# Patient Record
Sex: FEMALE | Race: WHITE | Employment: FULL TIME | URBAN - METROPOLITAN AREA
[De-identification: names, ages, dates, MRNs, and addresses within clinical notes are randomized per-mention and may not be internally consistent; named-entity substitution may affect disease eponyms.]

---

## 2017-11-06 ENCOUNTER — HOSPITAL ENCOUNTER (OUTPATIENT)
Dept: PHYSICAL THERAPY | Age: 32
Discharge: HOME | End: 2017-11-06
Payer: OTHER GOVERNMENT

## 2017-11-06 PROCEDURE — 97162 PT EVAL MOD COMPLEX 30 MIN: CPT

## 2017-11-06 PROCEDURE — 97110 THERAPEUTIC EXERCISES: CPT

## 2017-11-06 PROCEDURE — 97140 MANUAL THERAPY 1/> REGIONS: CPT

## 2017-11-06 NOTE — PROGRESS NOTES
PHYSICAL THERAPY - DAILY TREATMENT NOTE    Patient Name: Lizandro Martell        Date: 2017  : 1985   YES Patient  Verified  Visit #:     Insurance: Payor:  / Plan: Kerwin Meek DEPENDENTS / Product Type:  /      In time: 1100 Out time: 1200   Total Treatment Time: 60     Medicare Time Tracking (below)   Total Timed Codes (min):   1:1 Treatment Time:       TREATMENT AREA =  Low back pain [M54.5]  SUBJECTIVE  Pain Level (on 0 to 10 scale):  3  / 10   Medication Changes/New allergies or changes in medical history, any new surgeries or procedures?     NO    If yes, update Summary List   Subjective Functional Status/Changes:  []  No changes reported     See POC          OBJECTIVE  Modalities Rationale:     decrease pain and increase tissue extensibility to improve patient's ability to tolerate ADL's   min [] Estim, type/location:                                      []  att     []  unatt     []  w/US     []  w/ice    []  w/heat    min []  Mechanical Traction: type/lbs                   []  pro   []  sup   []  int   []  cont    []  before manual    []  after manual    min []  Ultrasound, settings/location:      min []  Iontophoresis w/ dexamethasone, location:                                               []  take home patch       []  in clinic   10 min [x]  Ice     [x]  Heat    location/position: Semireclined; CP to L/S MH to C/S/UT    min []  Vasopneumatic Device, press/temp:     min []  Other:    [] Skin assessment post-treatment (if applicable):    []  intact    []  redness- no adverse reaction     []redness  adverse reaction:        10 min Therapeutic Exercise:  [x]  See flow sheet   Rationale:       increase ROM and increase strength to improve the patients ability to perform ADL's     10 min Manual Therapy: MET for posterior rotation   Rationale:       decrease pain to improve patient's ability to walk without pain     min Therapeutic Activity: Rationale:        min Neuromuscular Re-ed:    Rationale:       min Gait Training:    Rationale:       min Patient Education:  YES  Reviewed HEP   [x]  Progressed/Changed HEP based on:   Issued written HEP and reviewed     Other Objective/Functional Measures:    See POC  TE per FS     Post Treatment Pain Level (on 0 to 10) scale:   3  / 10     ASSESSMENT  Assessment/Changes in Function:     See POC     []  See Progress Note/Recertification   Patient will continue to benefit from skilled PT services to modify and progress therapeutic interventions, address functional mobility deficits, address ROM deficits, address strength deficits, analyze and address soft tissue restrictions, analyze and cue movement patterns, analyze and modify body mechanics/ergonomics and assess and modify postural abnormalities to attain remaining goals. Progress toward goals / Updated goals:    See POC     PLAN  [x]  Upgrade activities as tolerated YES Continue plan of care   []  Discharge due to :    []  Other:      Therapist: Bridgette Foster PT    Date: 11/6/2017 Time: 11:09 AM     No future appointments.

## 2017-11-06 NOTE — PROGRESS NOTES
[facility] PHYSICAL THERAPY AT [facility] [address] [place], [address]  Phone: [phone]  Fax: [phone] / [address] [facility]  Patient Name: Alfie Avitia : 1985   Medical   Diagnosis: Low back pain [M54.5] Treatment Diagnosis: Back and hip pain   Onset Date:      Referral Source: Donavan Zapata NP Start of Care [facility]): 2017   Prior Hospitalization: See medical history Provider #: [id number]   Prior Level of Function: Independent all activities/no restrictions   Comorbidities: Pregnant   Medications: Verified on Patient Summary List   The Plan of Care and following information is based on the information from the initial evaluation.   ==================================================================================  Assessment / key information:  Patient is a 33 y.o. female who presents to [facility] at [address] [facility] with Dx of back and hip pain. Patient is 33 weeks pregnant and reports worsening left hip and back pain. She states she has chronic upper back pain that has worsened with pregnancy and the hip pain is recent since completing the wicked 10K. Patient reports sx are intermittent  in nature with worsening of sx with running 30-45 minutes, yoga. Sx improve with rest.  Average reported pain level at 3/10, 5/10 at worst & 2-3/10 at best. 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 Upon objective evaluation patient demonstrates L posterior rotation, resolved 50% with MET, decreased scapular strength, 4/5 bilaterally, significant tightness in bilateral UT and levator with decreased c/s rotation by 25% L>R, pain in R UT. Decreased mobility throughout T/S.   Patient can benefit from PT interventions to improve ROM, strength, posture, body mechanics, decrease pain, to facilitate ADLs & overall functional status.   ==================================================================================  Eval Complexity: History MEDIUM  Complexity : 1-2 comorbidities / personal factors will impact the outcome/ POC ;  Examination  MEDIUM Complexity : 3 Standardized tests and measures addressing body structure, function, activity limitation and / or participation in recreation ; Presentation MEDIUM Complexity : Evolving with changing characteristics ; Decision Making MEDIUM Complexity : FOTO score of 26-74; Overall Complexity MEDIUM  Problem List: pain affecting function, decrease ROM, decrease strength, decrease ADL/ functional abilitiies, decrease activity tolerance, decrease flexibility/ joint mobility and other FOTO 56   Treatment Plan may include any combination of the following: Therapeutic exercise, Therapeutic activities, Neuromuscular re-education, Physical agent/modality, Manual therapy, Patient education and Other: Dry Needling  Patient / Family readiness to learn indicated by: asking questions, trying to perform skills and interest  Persons(s) to be included in education: patient (P)  Barriers to Learning/Limitations: None  Measures taken:    Patient Goal (s): \"Get my hip back in line, not in constant pain\"   Patient self reported health status: excellent  Rehabilitation Potential: excellent   Short Term Goals: To be accomplished in  2-3  weeks:  1) Patient to be independent and compliant with initial HEP to prevent further disability. 2) Patient will report decreased c/o pain to < or = 3/10 at worst to facilitate increased tolerance to sitting/work related tasks with manageable sx in the back. 3) Improve FOTO score from 55 points to > or = 60 points indicating improved tolerance with ADLs in regards to lower back.  Long Term Goals:  To be accomplished in  4-6  weeks:  1)Patient to be independent & compliant with progressive HEP in preparation for D/C.  2)  Patient will be independent with SI alignment/correction to prevent reoccurrence of injury throughout prgnancy  3) Improve FOTO score from 60 points to > or = 66 points indicating improved tolerance with ADLs in regards to lower back. 4) Patient to report 50% improvement in back pain throughout the day allowing for improved tolerance to ADL's and work related tasks. Frequency / Duration:   Patient to be seen  2-3  times per week for 4-6  weeks:  Patient / Caregiver education and instruction: self care, activity modification and exercises  G-Codes (GP): n/a  Therapist Signature: Anni Stroud PT Date: 97/0/3369   Certification Period: n/a Time: 11:09 AM   ==================================================================================  I certify that the above Physical Therapy Services are being furnished while the patient is under my care. I agree with the treatment plan and certify that this therapy is necessary. Physician Signature:        Date:       Time:     Please sign and return to [facility] or you may fax the signed copy to [phone]. Thank you.

## 2017-11-10 ENCOUNTER — HOSPITAL ENCOUNTER (OUTPATIENT)
Dept: PHYSICAL THERAPY | Age: 32
Discharge: HOME | End: 2017-11-10
Payer: OTHER GOVERNMENT

## 2017-11-10 PROCEDURE — 97140 MANUAL THERAPY 1/> REGIONS: CPT

## 2017-11-10 PROCEDURE — 97110 THERAPEUTIC EXERCISES: CPT

## 2017-11-10 NOTE — PROGRESS NOTES
PHYSICAL THERAPY - DAILY TREATMENT NOTE    Patient Name: Andrew Irene        Date: 11/10/2017  : 1985    Patient  Verified: YES  Visit #:   2   of   12  Insurance: Payor:  / Plan: [employer] AND DEPENDENTS / Product Type:  /      In time: 8:30 Out time: 9:25   Total Treatment Time: 55     Medicare Time Tracking (below)   Total Timed Codes (min):  - 1:1 Treatment Time:  -     TREATMENT AREA/ DIAGNOSIS = Low back pain [M54.5]    SUBJECTIVE  Pain Level (on 0 to 10 scale):  0-5  / 10   Medication Changes/New allergies or changes in medical history, any new surgeries or procedures?     NO    If yes, update Summary List   Subjective Functional Status/Changes:  []  No changes reported     Functional improvement no pain at rest and my upper back feels better   Functional limitation my low back hurts with movement      OBJECTIVE  Modalities Rationale:     decrease edema, decrease inflammation and decrease pain to improve patient's ability to perform ADLs without pain   min [] Estim, type/location:                                      []  att     []  unatt     []  w/US     []  w/ice    []  w/heat    min []  Mechanical Traction: type/lbs                   []  pro   []  sup   []  int   []  cont    []  before manual    []  after manual    min []  Ultrasound, settings/location:      min []  Iontophoresis w/ dexamethasone, location:                                               []  take home patch       []  in clinic   10 min [x]  Ice     [x]  Heat    location/position: Ice to L/S, SI and MH to upper back    min []  Vasopneumatic Device, press/temp:     min []  Other:    [x] Skin assessment post-treatment (if applicable):    [x]  intact    [x]  redness- no adverse reaction     []redness  adverse reaction:        10 min Manual Therapy: SI assessment MET to correct L post innominate   Rationale:      decrease pain, increase ROM and increase tissue extensibility to improve patient's ability to perform ADLs without pain    40 min Therapeutic Exercise:  [x]  See flow sheet   Rationale:      increase ROM and increase strength to improve the patients ability to perform ADLs without pain          min Patient Education:  Yes    [x] Reviewed HEP   []  Progressed/Changed HEP based on: Other Objective/Functional Measures:    Pt with improved upper T/s pain with HEP   Pt also with L post innominate, indicated by elevated L ASIS, lower L PSIS and even illiac crest  L post innominate corrected with MET  Had to correct a second time after therex    Post Treatment Pain Level (on 0 to 10) scale:   3  / 10     ASSESSMENT  Assessment/Changes in Function:     A little sore in her L SI after treatment     []  See Progress Note/Recertification   Patient will continue to benefit from skilled PT services to modify and progress therapeutic interventions, address functional mobility deficits, address ROM deficits, address strength deficits, analyze and address soft tissue restrictions, analyze and cue movement patterns, analyze and modify body mechanics/ergonomics and assess and modify postural abnormalities to attain remaining goals.    Progress toward goals / Updated goals:    Less T/S pain already      PLAN  [x]  Upgrade activities as tolerated YES Continue plan of care   []  Discharge due to :    []  Other:      Therapist: Tamar Boyd, PT    Date: 11/10/2017 Time: 11:45 AM        Future Appointments  Date Time Provider Maurisio Michael   11/14/2017 11:30 AM Tamar Boyd, PT [facility]   11/17/2017 11:00 AM Tamar Boyd, PT [facility]

## 2017-11-14 ENCOUNTER — HOSPITAL ENCOUNTER (OUTPATIENT)
Dept: PHYSICAL THERAPY | Age: 32
Discharge: HOME | End: 2017-11-14
Payer: OTHER GOVERNMENT

## 2017-11-14 PROCEDURE — 97110 THERAPEUTIC EXERCISES: CPT

## 2017-11-14 PROCEDURE — 97140 MANUAL THERAPY 1/> REGIONS: CPT

## 2017-11-17 ENCOUNTER — HOSPITAL ENCOUNTER (OUTPATIENT)
Dept: PHYSICAL THERAPY | Age: 32
Discharge: HOME | End: 2017-11-17
Payer: OTHER GOVERNMENT

## 2017-11-17 PROCEDURE — 97110 THERAPEUTIC EXERCISES: CPT

## 2017-11-17 PROCEDURE — 97140 MANUAL THERAPY 1/> REGIONS: CPT

## 2017-11-17 NOTE — PROGRESS NOTES
PHYSICAL THERAPY - DAILY TREATMENT NOTE    Patient Name: Glenys Dance        Date: 2017  : 1985    Patient  Verified: YES  Visit #:   3   of   12  Insurance: Payor:  / Plan: [employer] [facility] [address] AND DEPENDENTS / Product Type:  /      In time: 11 Out time: 11:55   Total Treatment Time: 55     Medicare Time Tracking (below)   Total Timed Codes (min):  - 1:1 Treatment Time:  -     TREATMENT AREA/ DIAGNOSIS = Low back pain [M54.5]    SUBJECTIVE  Pain Level (on 0 to 10 scale):  3-8  / 10   Medication Changes/New allergies or changes in medical history, any new surgeries or procedures?     NO    If yes, update Summary List   Subjective Functional Status/Changes:  []  No changes reported     Functional improvement I had very little pain for 2 years   Functional limitation it hurts to roll over and with sit to stand       OBJECTIVE  Modalities Rationale:     decrease edema, decrease inflammation and decrease pain to improve patient's ability to perform ADLs without pain   min [] Estim, type/location:                                      []  att     []  unatt     []  w/US     []  w/ice    []  w/heat    min []  Mechanical Traction: type/lbs                   []  pro   []  sup   []  int   []  cont    []  before manual    []  after manual    min []  Ultrasound, settings/location:      min []  Iontophoresis w/ dexamethasone, location:                                               []  take home patch       []  in clinic   10 min [x]  Ice     [x]  Heat    location/position: Ice to B SI and MHP to T/s nad L/S     min []  Vasopneumatic Device, press/temp:     min []  Other:    [] Skin assessment post-treatment (if applicable):    []  intact    []  redness- no adverse reaction     []redness  adverse reaction:        10 min Manual Therapy: MET to correct L post innominate, b piriformis stretch   Rationale:      decrease pain, increase ROM and increase tissue extensibility to improve patient's ability to perform ADLs without pain    35 min Therapeutic Exercise:  [x]  See flow sheet   Rationale:      increase ROM and increase strength to improve the patients ability to perform ADLs without pain          min Patient Education:  Yes    [x] Reviewed HEP   []  Progressed/Changed HEP based on: Other Objective/Functional Measures:    Pt with L SI jiont pain, with post L innominate, indicated by elevated L ASIS  Had to re adjust alignement after therex   Post Treatment Pain Level (on 0 to 10) scale:   1  / 10     ASSESSMENT  Assessment/Changes in Function:     Pt with 2 days of pain relief after last visit       []  See Progress Note/Recertification   Patient will continue to benefit from skilled PT services to modify and progress therapeutic interventions, address functional mobility deficits, address ROM deficits, address strength deficits, analyze and address soft tissue restrictions, analyze and cue movement patterns, analyze and modify body mechanics/ergonomics and assess and modify postural abnormalities to attain remaining goals. Progress toward goals / Updated goals:    Pt with 2 days of pain relief after last session     PLAN  []  Upgrade activities as tolerated YES Continue plan of care   []  Discharge due to :    []  Other:      Therapist: Penny Kaur PT    Date: 11/17/2017 Time: 11:55 AM        No future appointments.

## 2017-11-29 ENCOUNTER — HOSPITAL ENCOUNTER (OUTPATIENT)
Dept: PHYSICAL THERAPY | Age: 32
Discharge: HOME | End: 2017-11-29
Payer: OTHER GOVERNMENT

## 2017-11-29 PROCEDURE — 97140 MANUAL THERAPY 1/> REGIONS: CPT

## 2017-11-29 PROCEDURE — 97110 THERAPEUTIC EXERCISES: CPT

## 2017-11-29 NOTE — PROGRESS NOTES
PHYSICAL THERAPY - DAILY TREATMENT NOTE    Patient Name: January Mohan        Date: 2017  : 1985    Patient  Verified: YES  Visit #:     Insurance: Payor:  / Plan: Kerwin Meek DEPENDENTS / Product Type:  /      In time: 12:55 Out time: 1:15   Total Treatment Time: 20     Medicare Time Tracking (below)   Total Timed Codes (min):  - 1:1 Treatment Time:  -     TREATMENT AREA/ DIAGNOSIS = Low back pain [M54.5]    SUBJECTIVE  Pain Level (on 0 to 10 scale):  0  / 10   Medication Changes/New allergies or changes in medical history, any new surgeries or procedures? NO    If yes, update Summary List   Subjective Functional Status/Changes:  []  No changes reported     Functional improvement  I have felt good the last few days  Functional limitation  I was sore for 2 or 3 days after the last visit       5 min Manual Therapy: MET to correct L post innominate x 2. Rationale:      decrease pain and increase tissue extensibility to improve patient's ability to perform ADLs without pain    15 min Therapeutic Exercise:  [x]  See flow sheet   Rationale:      increase ROM and increase strength to improve the patients ability to perform ADLs without pain          min Patient Education:  Yes    [x] Reviewed HEP   []  Progressed/Changed HEP based on:         Other Objective/Functional Measures:    Pt with L post innominate, corrected with MET, going right back out into post rotation with 15 minutes of therex, corrected a second time with MET  Pt declined ice, pain free after PT    Post Treatment Pain Level (on 0 to 10) scale:   0  / 10     ASSESSMENT  Assessment/Changes in Function:     Easily corrected with L post innominate, but easy to go out of alignement      []  See Progress Note/Recertification   Patient will continue to benefit from skilled PT services to modify and progress therapeutic interventions, address functional mobility deficits, address ROM deficits, address strength deficits, analyze and address soft tissue restrictions, analyze and cue movement patterns and analyze and modify body mechanics/ergonomics to attain remaining goals. Progress toward goals / Updated goals:    No mid back pain      PLAN  [x]  Upgrade activities as tolerated YES Continue plan of care   []  Discharge due to :    []  Other:      Therapist: Kami Lemus, PT    Date: 11/29/2017 Time: 1:18 PM        No future appointments.

## 2018-02-13 NOTE — PROGRESS NOTES
[address] [facility] PHYSICAL THERAPY AT [facility] [address] [place], [address]  Phone: [phone]  Fax: [phone] SUMMARY  Patient Name: Mandi Saxena : 1985   Treatment/Medical Diagnosis: Low back pain [M54.5]   Referral Source: Gordo Hanna NP     Date of Initial Visit: 17 Attended Visits: 5 Missed Visits: 2     SUMMARY OF TREATMENT  Manual therapy, including massage and Muscle energy techniques, to correct L post innominate. Core stability exercises. Ice. Patient education on posture/body mechanics and HEP  CURRENT STATUS  The patient stopped attending therapy after her 17 visit and has been discharged. She had a recurring L post innominate, that we could correct with MET, but would quickly return. This issue was complicated by her pregnancy. RECOMMENDATIONS  Discontinue therapy due to lack of attendance or compliance. If you have any questions/comments please contact us directly at [phone]. Thank you for allowing us to assist in the care of your patient. Therapist Signature:  Beto Hankins PT, MDT Date: 18     Time: 3:40 PM

## 2020-09-24 ENCOUNTER — HOSPITAL ENCOUNTER (OUTPATIENT)
Dept: NON INVASIVE DIAGNOSTICS | Age: 35
Discharge: HOME | End: 2020-09-24
Payer: COMMERCIAL

## 2020-09-24 PROCEDURE — 93005 ELECTROCARDIOGRAM TRACING: CPT | Performed by: FAMILY MEDICINE

## 2020-09-25 LAB
EKG P AXIS: 37 DEGREES
EKG P-R INTERVAL: 132 MS
EKG Q-T INTERVAL: 440 MS
EKG QRS DURATION: 94 MS
EKG QTC CALCULATION (BAZETT): 439 MS
EKG T AXIS: 29 DEGREES